# Patient Record
Sex: MALE | Race: WHITE | Employment: OTHER | URBAN - METROPOLITAN AREA
[De-identification: names, ages, dates, MRNs, and addresses within clinical notes are randomized per-mention and may not be internally consistent; named-entity substitution may affect disease eponyms.]

---

## 2017-02-13 ENCOUNTER — OFFICE VISIT (OUTPATIENT)
Dept: ORTHOPEDIC SURGERY | Age: 62
End: 2017-02-13

## 2017-02-13 VITALS — BODY MASS INDEX: 34.36 KG/M2 | HEIGHT: 70 IN | WEIGHT: 240 LBS

## 2017-02-13 DIAGNOSIS — Z96.652 STATUS POST TOTAL KNEE REPLACEMENT, LEFT: Primary | ICD-10-CM

## 2017-02-13 PROCEDURE — 99213 OFFICE O/P EST LOW 20 MIN: CPT | Performed by: ORTHOPAEDIC SURGERY

## 2017-02-13 RX ORDER — MELOXICAM 15 MG/1
15 TABLET ORAL DAILY
Qty: 90 TABLET | Refills: 2 | Status: SHIPPED | OUTPATIENT
Start: 2017-02-13 | End: 2019-05-03 | Stop reason: ALTCHOICE

## 2017-11-20 ENCOUNTER — OFFICE VISIT (OUTPATIENT)
Dept: ORTHOPEDIC SURGERY | Age: 62
End: 2017-11-20

## 2017-11-20 VITALS — HEIGHT: 70 IN | WEIGHT: 240 LBS | BODY MASS INDEX: 34.36 KG/M2

## 2017-11-20 DIAGNOSIS — Z96.652 STATUS POST TOTAL KNEE REPLACEMENT, LEFT: ICD-10-CM

## 2017-11-20 DIAGNOSIS — M25.562 LEFT KNEE PAIN, UNSPECIFIED CHRONICITY: Primary | ICD-10-CM

## 2017-11-20 PROCEDURE — 73560 X-RAY EXAM OF KNEE 1 OR 2: CPT | Performed by: ORTHOPAEDIC SURGERY

## 2017-11-20 PROCEDURE — 99213 OFFICE O/P EST LOW 20 MIN: CPT | Performed by: ORTHOPAEDIC SURGERY

## 2017-11-20 NOTE — PROGRESS NOTES
KNEE LEFT LIMITED  Diagnostic Test Findings:  2 views of the left knee show normal post-operative changes following a total knee arthroplasty. No signs of hardware loosening. Assessment :  left knee status post total knee arthroplasty. Impression:  Encounter Diagnoses   Name Primary?  Left knee pain, unspecified chronicity Yes    Status post total knee replacement, left        Office Procedures:  Orders Placed This Encounter   Procedures    XR KNEE LEFT (1-2 VIEWS)     [id number]     Order Specific Question:   Reason for exam:     Answer:   Pain       Treatment Plan: At this time, the patient is doing very well overall. We encouraged him to keep his strengthening up and will follow-up with us in approximately 1 year with new xrays at that time. Tammy Maya PA-C, scribing for Dr. Davion Gaston          This dictation was performed with a verbal recognition program [facility]) and it was checked for errors. It is possible that there are still dictated errors within this office note. If so, please bring any errors to my attention for an addendum. All efforts were made to ensure that this office note is accurate.

## 2018-01-22 ENCOUNTER — OFFICE VISIT (OUTPATIENT)
Dept: ORTHOPEDIC SURGERY | Age: 63
End: 2018-01-22

## 2018-01-22 VITALS — WEIGHT: 240 LBS | HEIGHT: 70 IN | BODY MASS INDEX: 34.36 KG/M2

## 2018-01-22 DIAGNOSIS — M25.40 EFFUSION INTO JOINT: ICD-10-CM

## 2018-01-22 DIAGNOSIS — M25.561 RIGHT KNEE PAIN, UNSPECIFIED CHRONICITY: Primary | ICD-10-CM

## 2018-01-22 PROCEDURE — 99213 OFFICE O/P EST LOW 20 MIN: CPT | Performed by: ORTHOPAEDIC SURGERY

## 2018-01-22 PROCEDURE — 20610 DRAIN/INJ JOINT/BURSA W/O US: CPT | Performed by: ORTHOPAEDIC SURGERY

## 2018-01-22 PROCEDURE — 73564 X-RAY EXAM KNEE 4 OR MORE: CPT | Performed by: ORTHOPAEDIC SURGERY

## 2018-01-22 NOTE — PROGRESS NOTES
SITE:  RIGHT KNEE    MARCAINE  NDC# [id number]  LOT NUMBER#  [id number]    DEPO 40MG  NDC# 8574-3678-12  LOT NUMBER#  [id number]    LIDOCAINE    NDC# 9128-1364-17  LOT NUMBER#  [id number]
Moderate-sized effusion. Range of Motion:  His range of motion is good he has full extension flexes to 130. Strength:  He has normal strength. Special Tests:  Ligamentous examination no evidence of instability. Skin: There are no rashes, ulcerations or lesions. Gait: walks today with a normal gait. Reflex: Normal    Ipsilateral Hip Exam:  Shows normal range of motion without pain. Distal neurologic and circulatory examination of the ipsilateral lower extremity appears intact without deficit    Additional Examinations:  Left Lower Extremity: Examination of the left lower extremity does not show any tenderness, deformity or injury. Range of motion is unremarkable. There is no gross instability. There are no rashes, ulcerations or lesions. Strength and tone are normal.      XR KNEE RIGHT STANDARD EXTENDED VW  MD ARTHROCENTESIS ASPIR&/INJ MAJOR JT/BURSA W/O US  MD METHYLPREDNISOLONE 40 MG INJ  Diagnostic Test Findings:  Xrays obtained and reviewed in office  4 views of the right knee show has a satisfactory joint space. Assessment :  Problem right knee medial meniscus tear with a large effusion. Impression:  Encounter Diagnoses   Name Primary?  Right knee pain, unspecified chronicity Yes    Effusion into joint        Office Procedures:  Orders Placed This Encounter   Procedures    XR KNEE RIGHT (MIN 4 VIEWS)     [id number]     Order Specific Question:   Reason for exam:     Answer:   Pain    MD ARTHROCENTESIS ASPIR&/INJ MAJOR JT/BURSA W/O US    MD METHYLPREDNISOLONE 40 MG INJ       Treatment Plan:  Current plan is to aspirate and inject his knee. We did had been obtained about 30 mL of clear yellow fluid. He tolerated the injection well. Follow-up in 3 weeks for reexamination. The risks and benefits of an injection were discussed with the patient. The patient had full opportunity to ask questions and all were answered. The patient then provided verbal informed consent.

## 2018-01-29 RX ORDER — AMOXICILLIN 500 MG/1
TABLET, FILM COATED ORAL
Qty: 4 TABLET | Refills: 3 | Status: SHIPPED | OUTPATIENT
Start: 2018-01-29 | End: 2019-03-12 | Stop reason: SDUPTHER

## 2018-12-12 ENCOUNTER — OFFICE VISIT (OUTPATIENT)
Dept: ORTHOPEDIC SURGERY | Age: 63
End: 2018-12-12
Payer: COMMERCIAL

## 2018-12-12 VITALS — BODY MASS INDEX: 34.36 KG/M2 | WEIGHT: 240 LBS | HEIGHT: 70 IN

## 2018-12-12 DIAGNOSIS — M25.561 RIGHT KNEE PAIN, UNSPECIFIED CHRONICITY: Primary | ICD-10-CM

## 2018-12-12 PROCEDURE — 20610 DRAIN/INJ JOINT/BURSA W/O US: CPT | Performed by: ORTHOPAEDIC SURGERY

## 2018-12-12 PROCEDURE — 99213 OFFICE O/P EST LOW 20 MIN: CPT | Performed by: ORTHOPAEDIC SURGERY

## 2019-03-21 ENCOUNTER — OFFICE VISIT (OUTPATIENT)
Dept: ORTHOPEDIC SURGERY | Age: 64
End: 2019-03-21
Payer: MEDICARE

## 2019-03-21 DIAGNOSIS — S83.8X1A ACUTE MEDIAL MENISCAL INJURY OF KNEE, RIGHT, INITIAL ENCOUNTER: ICD-10-CM

## 2019-03-21 DIAGNOSIS — M25.561 ACUTE PAIN OF RIGHT KNEE: Primary | ICD-10-CM

## 2019-03-21 PROCEDURE — 20610 DRAIN/INJ JOINT/BURSA W/O US: CPT | Performed by: PHYSICIAN ASSISTANT

## 2019-03-21 PROCEDURE — 1036F TOBACCO NON-USER: CPT | Performed by: PHYSICIAN ASSISTANT

## 2019-03-21 PROCEDURE — 4040F PNEUMOC VAC/ADMIN/RCVD: CPT | Performed by: PHYSICIAN ASSISTANT

## 2019-03-21 PROCEDURE — G8417 CALC BMI ABV UP PARAM F/U: HCPCS | Performed by: PHYSICIAN ASSISTANT

## 2019-03-21 PROCEDURE — G8427 DOCREV CUR MEDS BY ELIG CLIN: HCPCS | Performed by: PHYSICIAN ASSISTANT

## 2019-03-21 PROCEDURE — 3017F COLORECTAL CA SCREEN DOC REV: CPT | Performed by: PHYSICIAN ASSISTANT

## 2019-03-21 PROCEDURE — 1101F PT FALLS ASSESS-DOCD LE1/YR: CPT | Performed by: PHYSICIAN ASSISTANT

## 2019-03-21 PROCEDURE — G8484 FLU IMMUNIZE NO ADMIN: HCPCS | Performed by: PHYSICIAN ASSISTANT

## 2019-03-21 PROCEDURE — 1123F ACP DISCUSS/DSCN MKR DOCD: CPT | Performed by: PHYSICIAN ASSISTANT

## 2019-03-21 PROCEDURE — 99213 OFFICE O/P EST LOW 20 MIN: CPT | Performed by: PHYSICIAN ASSISTANT

## 2019-03-21 RX ORDER — AMOXICILLIN 500 MG/1
TABLET, FILM COATED ORAL
Qty: 4 TABLET | Refills: 3 | Status: SHIPPED | OUTPATIENT
Start: 2019-03-21 | End: 2019-05-03 | Stop reason: ALTCHOICE

## 2019-04-02 RX ORDER — AMOXICILLIN 500 MG/1
CAPSULE ORAL
Qty: 4 CAPSULE | Refills: 2 | Status: SHIPPED | OUTPATIENT
Start: 2019-04-02 | End: 2019-05-03 | Stop reason: ALTCHOICE

## 2019-04-22 ENCOUNTER — TELEPHONE (OUTPATIENT)
Dept: ORTHOPEDIC SURGERY | Age: 64
End: 2019-04-22

## 2019-04-22 ENCOUNTER — OFFICE VISIT (OUTPATIENT)
Dept: ORTHOPEDIC SURGERY | Age: 64
End: 2019-04-22
Payer: MEDICARE

## 2019-04-22 VITALS — WEIGHT: 240 LBS | BODY MASS INDEX: 34.36 KG/M2 | HEIGHT: 70 IN

## 2019-04-22 DIAGNOSIS — S83.8X1A ACUTE MEDIAL MENISCAL INJURY OF KNEE, RIGHT, INITIAL ENCOUNTER: Primary | ICD-10-CM

## 2019-04-22 DIAGNOSIS — S83.231D COMPLEX TEAR OF MEDIAL MENISCUS OF RIGHT KNEE AS CURRENT INJURY, SUBSEQUENT ENCOUNTER: Primary | ICD-10-CM

## 2019-04-22 PROCEDURE — 99213 OFFICE O/P EST LOW 20 MIN: CPT | Performed by: ORTHOPAEDIC SURGERY

## 2019-04-22 PROCEDURE — G8417 CALC BMI ABV UP PARAM F/U: HCPCS | Performed by: ORTHOPAEDIC SURGERY

## 2019-04-22 PROCEDURE — G8427 DOCREV CUR MEDS BY ELIG CLIN: HCPCS | Performed by: ORTHOPAEDIC SURGERY

## 2019-04-22 PROCEDURE — 3017F COLORECTAL CA SCREEN DOC REV: CPT | Performed by: ORTHOPAEDIC SURGERY

## 2019-04-22 PROCEDURE — 4040F PNEUMOC VAC/ADMIN/RCVD: CPT | Performed by: ORTHOPAEDIC SURGERY

## 2019-04-22 PROCEDURE — 1036F TOBACCO NON-USER: CPT | Performed by: ORTHOPAEDIC SURGERY

## 2019-04-22 PROCEDURE — 1123F ACP DISCUSS/DSCN MKR DOCD: CPT | Performed by: ORTHOPAEDIC SURGERY

## 2019-04-22 NOTE — PROGRESS NOTES
Chief Complaint:  Knee Pain (RIGHT  MRI RESULTS)      History of Present Illness:  Abilio Mcclelland is a 75 y.o. male here regarding right knee pain. He is status post a previous left total knee arthroplasty. His previous visit with us was in [facility] 2019 when he had an injection in his right knee. It helped a great deal and got him through his vacation to [place]. When he returns he got an MRI and is here today to go over those results. He denies any new injuries to his knee. The patient is currently ambulating independently. The pain is located Primarily on the medial side of his knee. The elliptical machine seemed to aggravate the knee. He describes the symptoms as aching and sharp. Symptoms improve with rest. The symptoms are made worse with activity. Medical History:  Patient's medications, allergies, past medical, surgical, social and family histories were reviewed and updated as appropriate. Review of Systems:  Pertinent items are noted in HPI  Review of systems reviewed from Patient History Form dated on 12/12/2018 and available in the patient's chart under the Media tab. Vital Signs: There were no vitals filed for this visit. Pain Assessment:  Pain Assessment  Location of Pain: Knee  Location Modifiers: Right  Severity of Pain: 5  Quality of Pain: Dull, Aching  Frequency of Pain: Intermittent  Aggravating Factors: Standing, Squatting, Walking, Stairs  Result of Injury: No  Work-Related Injury: No  Are there other pain locations you wish to document?: No         General Exam:   Constitutional: Patient is adequately groomed with no evidence of malnutrition  Mental Status: The patient is oriented to time, place and person. The patient's mood and affect are appropriate. Vascular: Examination reveals no swelling or calf tenderness. Peripheral pulses are palpable and 2+. Neurological: The patient has good coordination. There is no weakness or sensory deficit.     Right Knee Examination  Inspection:   No gross deformities noted. Minimal swelling noted. No erythema or ecchymosis. Skin is intact. Palpation:  Is tender mainly along the medial joint line. Minimal-sized effusion. Range of Motion:  His range of motion is good he has full extension flexes to 130. Strength:  He has normal strength. Special Tests:  Ligamentous examination no evidence of instability. Skin: There are no rashes, ulcerations or lesions. Gait: Slight antalgic gait. Reflex: Normal    Ipsilateral Hip Exam:  Shows normal range of motion without pain. Distal neurologic and circulatory examination of the ipsilateral lower extremity appears intact without deficit    Additional Examinations:  Left Lower Extremity: Examination of the left lower extremity does not show any tenderness, deformity or injury. Range of motion is within normal limits. There is no gross instability. There are no rashes, ulcerations or lesions. Strength and tone are normal.      None  Diagnostic Test Findings:  No new x-rays taken today    MRI of the right knee from April 18, 2014 shows a flap tear of the medial meniscus posterior horn with the flip-flap in the notch aspect. 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 Mild mucoid degeneration of the ACL. Assessment :  Right knee medial meniscus tear    Impression:  Encounter Diagnosis   Name Primary?  Acute medial meniscal injury of knee, right, initial encounter Yes       Office Procedures:  No orders of the defined types were placed in this encounter. Treatment Plan:  After going over the MRI results with the patient today, he is elected to proceed with surgical intervention. We will plan for a right knee video arthroscopy with partial medial meniscectomy and chondroplasty.     After discussion today, the patient has elected to proceed with surgical

## 2019-05-01 NOTE — PROGRESS NOTES
Abilio Sluder    Age 75 y.o.    male    1955    MRN [id number]    Date___________   Arrival Time_____________  OR Time____________Duration____     Procedure(s):  VIDEO ARTHROSCOPY RIGHT KNEE PARTIAL MEDIAL MENISECTOMY    Surgeon  ________________________________  [facility]   [place]       Phone [phone] (home) [phone] (work)      [address]  Cell Work  ______________________________________________________________________________________________________________________________________________________________________________________________________________________________________________________________________________________________________________________________________________________________    PCP__________________________Phone__________________________________      H&P__________________Bringing      Chart              Epic    DOS           Called_______  EKG__________________Bringing      Chart              Epic    DOS           Called_______  LAB__________________ Bringing      Chart              Epic    DOS           Called_______  CardiacClearance _______Bringing      Chart              Epic    DOS           Called_______      Cardiologist________________________ Phone___________________________      ? Yarsani concerns / Waiver on Chart            [employer]________________  ? Pre-op Instructions Given [place]          _________________________________  ? Directions to Surgery Center                          _________________________________  ? Transportation Home_______________      _________________________________  ?  Crutches/Walker__________________        _________________________________      ________Pre-op Orders   _______Transcribed    _______Wt.  ________Pharmacy          _______SCD  ______VTE     ______Beta Blocker  ________Consent

## 2019-05-08 ENCOUNTER — ANESTHESIA EVENT (OUTPATIENT)
Dept: OPERATING ROOM | Age: 64
End: 2019-05-08
Payer: MEDICARE

## 2019-05-09 ENCOUNTER — HOSPITAL ENCOUNTER (OUTPATIENT)
Age: 64
Setting detail: OUTPATIENT SURGERY
Discharge: HOME | End: 2019-05-09
Attending: ORTHOPAEDIC SURGERY | Admitting: ORTHOPAEDIC SURGERY
Payer: MEDICARE

## 2019-05-09 ENCOUNTER — ANESTHESIA (OUTPATIENT)
Dept: OPERATING ROOM | Age: 64
End: 2019-05-09
Payer: MEDICARE

## 2019-05-09 VITALS
OXYGEN SATURATION: 97 % | RESPIRATION RATE: 16 BRPM | BODY MASS INDEX: 34.07 KG/M2 | WEIGHT: 238 LBS | HEART RATE: 61 BPM | HEIGHT: 70 IN | TEMPERATURE: 97 F | DIASTOLIC BLOOD PRESSURE: 83 MMHG | SYSTOLIC BLOOD PRESSURE: 137 MMHG

## 2019-05-09 VITALS
OXYGEN SATURATION: 99 % | SYSTOLIC BLOOD PRESSURE: 158 MMHG | DIASTOLIC BLOOD PRESSURE: 96 MMHG | RESPIRATION RATE: 21 BRPM

## 2019-05-09 DIAGNOSIS — S83.231A COMPLEX TEAR OF MEDIAL MENISCUS OF RIGHT KNEE AS CURRENT INJURY, INITIAL ENCOUNTER: ICD-10-CM

## 2019-05-09 DIAGNOSIS — Z98.890 S/P RIGHT KNEE ARTHROSCOPY: Primary | ICD-10-CM

## 2019-05-09 PROCEDURE — 2709999900 HC NON-CHARGEABLE SUPPLY: Performed by: ORTHOPAEDIC SURGERY

## 2019-05-09 PROCEDURE — 6370000000 HC RX 637 (ALT 250 FOR IP): Performed by: ANESTHESIOLOGY

## 2019-05-09 PROCEDURE — 3700000001 HC ADD 15 MINUTES (ANESTHESIA): Performed by: ORTHOPAEDIC SURGERY

## 2019-05-09 PROCEDURE — 3600000014 HC SURGERY LEVEL 4 ADDTL 15MIN: Performed by: ORTHOPAEDIC SURGERY

## 2019-05-09 PROCEDURE — 7100000001 HC PACU RECOVERY - ADDTL 15 MIN: Performed by: ORTHOPAEDIC SURGERY

## 2019-05-09 PROCEDURE — 2500000003 HC RX 250 WO HCPCS: Performed by: NURSE ANESTHETIST, CERTIFIED REGISTERED

## 2019-05-09 PROCEDURE — 6360000002 HC RX W HCPCS: Performed by: ORTHOPAEDIC SURGERY

## 2019-05-09 PROCEDURE — 7100000010 HC PHASE II RECOVERY - FIRST 15 MIN: Performed by: ORTHOPAEDIC SURGERY

## 2019-05-09 PROCEDURE — 3600000004 HC SURGERY LEVEL 4 BASE: Performed by: ORTHOPAEDIC SURGERY

## 2019-05-09 PROCEDURE — 2580000003 HC RX 258: Performed by: ANESTHESIOLOGY

## 2019-05-09 PROCEDURE — 7100000000 HC PACU RECOVERY - FIRST 15 MIN: Performed by: ORTHOPAEDIC SURGERY

## 2019-05-09 PROCEDURE — 2500000003 HC RX 250 WO HCPCS: Performed by: ORTHOPAEDIC SURGERY

## 2019-05-09 PROCEDURE — 7100000011 HC PHASE II RECOVERY - ADDTL 15 MIN: Performed by: ORTHOPAEDIC SURGERY

## 2019-05-09 PROCEDURE — 2580000003 HC RX 258: Performed by: ORTHOPAEDIC SURGERY

## 2019-05-09 PROCEDURE — 2720000010 HC SURG SUPPLY STERILE: Performed by: ORTHOPAEDIC SURGERY

## 2019-05-09 PROCEDURE — 6360000002 HC RX W HCPCS: Performed by: NURSE ANESTHETIST, CERTIFIED REGISTERED

## 2019-05-09 PROCEDURE — 3700000000 HC ANESTHESIA ATTENDED CARE: Performed by: ORTHOPAEDIC SURGERY

## 2019-05-09 RX ORDER — METHYLPREDNISOLONE ACETATE 80 MG/ML
INJECTION, SUSPENSION INTRA-ARTICULAR; INTRALESIONAL; INTRAMUSCULAR; SOFT TISSUE PRN
Status: DISCONTINUED | OUTPATIENT
Start: 2019-05-09 | End: 2019-05-09 | Stop reason: ALTCHOICE

## 2019-05-09 RX ORDER — ACETAMINOPHEN 10 MG/ML
1000 INJECTION, SOLUTION INTRAVENOUS ONCE
Status: COMPLETED | OUTPATIENT
Start: 2019-05-09 | End: 2019-05-09

## 2019-05-09 RX ORDER — SODIUM CHLORIDE, SODIUM LACTATE, POTASSIUM CHLORIDE, CALCIUM CHLORIDE 600; 310; 30; 20 MG/100ML; MG/100ML; MG/100ML; MG/100ML
INJECTION, SOLUTION INTRAVENOUS CONTINUOUS
Status: DISCONTINUED | OUTPATIENT
Start: 2019-05-09 | End: 2019-05-09 | Stop reason: HOSPADM

## 2019-05-09 RX ORDER — OXYCODONE HYDROCHLORIDE AND ACETAMINOPHEN 5; 325 MG/1; MG/1
1 TABLET ORAL ONCE
Status: COMPLETED | OUTPATIENT
Start: 2019-05-09 | End: 2019-05-09

## 2019-05-09 RX ORDER — DEXAMETHASONE SODIUM PHOSPHATE 4 MG/ML
INJECTION, SOLUTION INTRA-ARTICULAR; INTRALESIONAL; INTRAMUSCULAR; INTRAVENOUS; SOFT TISSUE PRN
Status: DISCONTINUED | OUTPATIENT
Start: 2019-05-09 | End: 2019-05-09 | Stop reason: SDUPTHER

## 2019-05-09 RX ORDER — FENTANYL CITRATE 50 UG/ML
INJECTION, SOLUTION INTRAMUSCULAR; INTRAVENOUS PRN
Status: DISCONTINUED | OUTPATIENT
Start: 2019-05-09 | End: 2019-05-09 | Stop reason: SDUPTHER

## 2019-05-09 RX ORDER — HYDROCODONE BITARTRATE AND ACETAMINOPHEN 5; 325 MG/1; MG/1
1 TABLET ORAL EVERY 6 HOURS PRN
Qty: 28 TABLET | Refills: 0 | Status: SHIPPED | OUTPATIENT
Start: 2019-05-09 | End: 2019-05-16

## 2019-05-09 RX ORDER — MIDAZOLAM HYDROCHLORIDE 1 MG/ML
INJECTION INTRAMUSCULAR; INTRAVENOUS PRN
Status: DISCONTINUED | OUTPATIENT
Start: 2019-05-09 | End: 2019-05-09 | Stop reason: SDUPTHER

## 2019-05-09 RX ORDER — SODIUM CHLORIDE, SODIUM LACTATE, POTASSIUM CHLORIDE, CALCIUM CHLORIDE 600; 310; 30; 20 MG/100ML; MG/100ML; MG/100ML; MG/100ML
INJECTION, SOLUTION INTRAVENOUS CONTINUOUS PRN
Status: COMPLETED | OUTPATIENT
Start: 2019-05-09 | End: 2019-05-09

## 2019-05-09 RX ORDER — SODIUM CHLORIDE 0.9 % (FLUSH) 0.9 %
10 SYRINGE (ML) INJECTION EVERY 12 HOURS SCHEDULED
Status: DISCONTINUED | OUTPATIENT
Start: 2019-05-09 | End: 2019-05-09 | Stop reason: HOSPADM

## 2019-05-09 RX ORDER — BUPIVACAINE HYDROCHLORIDE 2.5 MG/ML
INJECTION, SOLUTION INFILTRATION; PERINEURAL PRN
Status: DISCONTINUED | OUTPATIENT
Start: 2019-05-09 | End: 2019-05-09 | Stop reason: ALTCHOICE

## 2019-05-09 RX ORDER — EPHEDRINE SULFATE 50 MG/ML
INJECTION INTRAVENOUS PRN
Status: DISCONTINUED | OUTPATIENT
Start: 2019-05-09 | End: 2019-05-09 | Stop reason: SDUPTHER

## 2019-05-09 RX ORDER — PROPOFOL 10 MG/ML
INJECTION, EMULSION INTRAVENOUS PRN
Status: DISCONTINUED | OUTPATIENT
Start: 2019-05-09 | End: 2019-05-09 | Stop reason: SDUPTHER

## 2019-05-09 RX ORDER — LIDOCAINE HYDROCHLORIDE 10 MG/ML
1 INJECTION, SOLUTION EPIDURAL; INFILTRATION; INTRACAUDAL; PERINEURAL
Status: DISCONTINUED | OUTPATIENT
Start: 2019-05-09 | End: 2019-05-09 | Stop reason: HOSPADM

## 2019-05-09 RX ORDER — SODIUM CHLORIDE 0.9 % (FLUSH) 0.9 %
10 SYRINGE (ML) INJECTION PRN
Status: DISCONTINUED | OUTPATIENT
Start: 2019-05-09 | End: 2019-05-09 | Stop reason: HOSPADM

## 2019-05-09 RX ORDER — OXYCODONE HYDROCHLORIDE AND ACETAMINOPHEN 5; 325 MG/1; MG/1
TABLET ORAL
Status: DISCONTINUED
Start: 2019-05-09 | End: 2019-05-09 | Stop reason: HOSPADM

## 2019-05-09 RX ORDER — ONDANSETRON 2 MG/ML
INJECTION INTRAMUSCULAR; INTRAVENOUS PRN
Status: DISCONTINUED | OUTPATIENT
Start: 2019-05-09 | End: 2019-05-09 | Stop reason: SDUPTHER

## 2019-05-09 RX ADMIN — ACETAMINOPHEN 1000 MG: 10 INJECTION, SOLUTION INTRAVENOUS at 09:22

## 2019-05-09 RX ADMIN — FENTANYL CITRATE 100 MCG: 50 INJECTION INTRAMUSCULAR; INTRAVENOUS at 10:35

## 2019-05-09 RX ADMIN — OXYCODONE HYDROCHLORIDE AND ACETAMINOPHEN 1 TABLET: 5; 325 TABLET ORAL at 11:46

## 2019-05-09 RX ADMIN — ONDANSETRON 4 MG: 2 INJECTION INTRAMUSCULAR; INTRAVENOUS at 10:36

## 2019-05-09 RX ADMIN — SODIUM CHLORIDE, POTASSIUM CHLORIDE, SODIUM LACTATE AND CALCIUM CHLORIDE: 600; 310; 30; 20 INJECTION, SOLUTION INTRAVENOUS at 09:21

## 2019-05-09 RX ADMIN — DEXAMETHASONE SODIUM PHOSPHATE 6 MG: 4 INJECTION, SOLUTION INTRAMUSCULAR; INTRAVENOUS at 10:36

## 2019-05-09 RX ADMIN — EPHEDRINE SULFATE 10 MG: 50 INJECTION INTRAVENOUS at 10:42

## 2019-05-09 RX ADMIN — Medication 2 G: at 10:31

## 2019-05-09 RX ADMIN — MIDAZOLAM HYDROCHLORIDE 2 MG: 2 INJECTION, SOLUTION INTRAMUSCULAR; INTRAVENOUS at 10:31

## 2019-05-09 RX ADMIN — PROPOFOL 200 MG: 10 INJECTION, EMULSION INTRAVENOUS at 10:35

## 2019-05-09 ASSESSMENT — PULMONARY FUNCTION TESTS
PIF_VALUE: 2
PIF_VALUE: 4
PIF_VALUE: 21
PIF_VALUE: 1
PIF_VALUE: 14
PIF_VALUE: 3
PIF_VALUE: 17
PIF_VALUE: 2
PIF_VALUE: 18
PIF_VALUE: 17
PIF_VALUE: 18
PIF_VALUE: 3
PIF_VALUE: 0
PIF_VALUE: 3
PIF_VALUE: 0
PIF_VALUE: 12
PIF_VALUE: 3
PIF_VALUE: 22
PIF_VALUE: 23
PIF_VALUE: 2
PIF_VALUE: 2
PIF_VALUE: 3
PIF_VALUE: 2
PIF_VALUE: 16
PIF_VALUE: 3
PIF_VALUE: 17
PIF_VALUE: 18
PIF_VALUE: 0
PIF_VALUE: 2
PIF_VALUE: 17
PIF_VALUE: 15
PIF_VALUE: 0
PIF_VALUE: 18
PIF_VALUE: 16
PIF_VALUE: 0
PIF_VALUE: 24

## 2019-05-09 ASSESSMENT — PAIN SCALES - GENERAL
PAINLEVEL_OUTOF10: 4
PAINLEVEL_OUTOF10: 2
PAINLEVEL_OUTOF10: 4
PAINLEVEL_OUTOF10: 2
PAINLEVEL_OUTOF10: 0

## 2019-05-09 ASSESSMENT — PAIN - FUNCTIONAL ASSESSMENT: PAIN_FUNCTIONAL_ASSESSMENT: 0-10

## 2019-05-09 NOTE — ANESTHESIA PRE PROCEDURE
lidocaine PF 1 % injection 1 mL  1 mL Intradermal Once PRN Zunilda Montoya MD           Allergies:  No Known Allergies    Problem List:    Patient Active Problem List   Diagnosis Code    Fracture of second toe, right, closed S92.501A    HTN (hypertension) I10    Hyperlipidemia E78.5    Obesity E66.9    Status post total knee replacement, left T36.914       Past Medical History:        Diagnosis Date    Arthritis     Hyperlipidemia     Hypertension     Oral herpes simplex infection     Sleep apnea     CPAP       Past Surgical History:        Procedure Laterality Date    COLONOSCOPY  2003    COLONOSCOPY  5/16/14    KNEE ARTHROSCOPY Left     x 2    TONSILLECTOMY      TOTAL KNEE ARTHROPLASTY Left 11/15/2016       Social History:    Social History     Tobacco Use    Smoking status: Never Smoker    Smokeless tobacco: Never Used   Substance Use Topics    Alcohol use: Yes     Alcohol/week: 1.2 oz     Types: 2 Cans of beer per week     Comment: 2/wk                                Counseling given: Not Answered      Vital Signs (Current):   Vitals:    05/03/19 1017   Weight: 238 lb (108 kg)   Height: 5' 10\" (1.778 m)                                              BP Readings from Last 3 Encounters:   11/16/16 104/72   08/11/16 132/86   04/28/16 139/89       NPO Status:                                                                                 BMI:   Wt Readings from Last 3 Encounters:   05/03/19 238 lb (108 kg)   04/22/19 240 lb (108.9 kg)   12/12/18 240 lb (108.9 kg)     Body mass index is 34.15 kg/m².     CBC:   Lab Results   Component Value Date    WBC 10.3 11/16/2016    RBC 3.72 11/16/2016    HGB 11.6 11/16/2016    HCT 33.6 11/16/2016    MCV 90.3 11/16/2016    RDW 13.1 11/16/2016     11/16/2016       CMP:   Lab Results   Component Value Date     11/16/2016    K 3.8 11/16/2016     11/16/2016    CO2 28 11/16/2016    BUN 19 11/16/2016    CREATININE 0.9 11/16/2016    GFRAA >60 11/16/2016    LABGLOM >60 11/16/2016    GLUCOSE 121 11/16/2016    CALCIUM 8.4 11/16/2016       POC Tests: No results for input(s): POCGLU, POCNA, POCK, POCCL, POCBUN, POCHEMO, POCHCT in the last 72 hours. Coags:   Lab Results   Component Value Date    PROTIME 11.8 11/09/2016    INR 1.03 11/09/2016    APTT 38.8 11/15/2016       HCG (If Applicable): No results found for: PREGTESTUR, PREGSERUM, HCG, HCGQUANT     ABGs: No results found for: PHART, PO2ART, VJT8YID, HFN5BJS, BEART, W6FNZJUX     Type & Screen (If Applicable):  No results found for: LABABO, LABRH    Anesthesia Evaluation    Airway: Mallampati: III  TM distance: >3 FB   Neck ROM: full  Mouth opening: > = 3 FB Dental:          Pulmonary:   (+) sleep apnea:                             Cardiovascular:    (+) hypertension:,                   Neuro/Psych:               GI/Hepatic/Renal:             Endo/Other:                     Abdominal:           Vascular:                                        Anesthesia Plan      general     ASA 2     (I discussed with the patient the risks and benefits of PIV, general anesthesia, IV Narcotics, PACU. All questions were answered the patient agrees with the plan.)  Induction: intravenous. Anesthetic plan and risks discussed with patient. Plan discussed with CRNA.                   Keyana Mario MD   5/9/2019

## 2019-05-09 NOTE — PROGRESS NOTES
Discharge instructions reviewed with patient/responsible adult and understanding verbalized. Discharge instructions signed and copies given. Patient discharged home with belongings. No n/v. Pain 3/10 tolerable.  Assisted to dress

## 2019-05-09 NOTE — OP NOTE
was placed in the proximal right lower extremity. The  extremity was prepped with Chloraprep and draped sterilely. A standard  superomedial, anteromedial and anterolateral portals were made. The  arthroscope was introduced. Inspection of the suprapatellar pouch  identified inflammatory changes within the pouch. He did have grade 2  and 3 chondromalacia of the patellofemoral joint and the trochlear  groove which required patella chondroplasty that was performed using a  small motorized shaver. We then passed the scope down to the medial  compartment. In the medial compartment, he had a very complex posterior horn and body  medial meniscus tear. The meniscus had peripheral rim that was intact. Therefore, we proceeded with partial medial meniscectomy using both  handheld and motorized instruments. Also, noticed that there is a  meniscal root that was intact as well. We used again motorized sheila  and baskets as well as the radiofrequency to help trim and smooth the  meniscus. We were very satisfied with result. All the loose fragments  were removed. Again, the patellofemoral chondroplasty was performed in  a separate compartment. At that time, the arthroscope was removed. The  portals were all closed with nylon suture. Sterile dressing was  applied. He was awakened and transported to Recovery in stable  condition.         Evan Schultz MD    D: 05/09/2019 11:21:43       T: 05/09/2019 14:33:50     [email or web address]  Job#: [id number]     Doc#: [id number]    CC:

## 2019-05-09 NOTE — H&P
I have reviewed the history and physical and examined the patient and find no relevant changes. I have reviewed with the patient and/or family the risks, benefits, and alternatives to the procedure.     Kia Escobedo MD  5/9/2019

## 2019-05-10 NOTE — ANESTHESIA POSTPROCEDURE EVALUATION
Department of Anesthesiology  Postprocedure Note    Patient: Judith Del Angel  MRN: [id number]  [place]: 1955  Date of evaluation: 5/10/2019  Time:  11:51 AM     Procedure Summary     Date:  05/09/19 Room / Location:  [facility] OR 02 / [facility] OR    Anesthesia Start:  1031 Anesthesia Stop:  1107    Procedure:  VIDEO ARTHROSCOPY RIGHT KNEE, PARTIAL MEDIAL MENISCECTOMY (Right Knee) Diagnosis:       Complex tear of medial meniscus of right knee as current injury, initial encounter      (Complex tear of medial meniscus of right knee as current injury, initial encounter [I57.676V])    Surgeon:  Ray Lombardi MD Responsible Provider:  Kellen Jama MD    Anesthesia Type:  general ASA Status:  2          Anesthesia Type: general    Luís Phase I: Luís Score: 10    Luís Phase II: Luís Score: 10    Last vitals: Reviewed and per EMR flowsheets. Anesthesia Post Evaluation    Patient location during evaluation: PACU  Level of consciousness: awake and alert  Airway patency: patent  Nausea & Vomiting: no nausea and no vomiting  Complications: no  Cardiovascular status: blood pressure returned to baseline  Respiratory status: acceptable  Hydration status: euvolemic  Comments: Postoperative Anesthesia Note    Name:    Judith Del Angel  MRN:      [id number]    Empty flowsheet group.        LABS:    CBC  Lab Results       Component                Value               Date/Time                  WBC                      10.3                11/16/2016 07:01 AM        HGB                      11.6 (L)            11/16/2016 07:01 AM        HCT                      33.6 (L)            11/16/2016 07:01 AM        PLT                      153                 11/16/2016 07:01 AM   RENAL  Lab Results       Component                Value               Date/Time                  NA                       139                 11/16/2016 07:01 AM        K                        3.8                 11/16/2016

## 2019-05-13 ENCOUNTER — HOSPITAL ENCOUNTER (OUTPATIENT)
Dept: PHYSICAL THERAPY | Age: 64
Setting detail: THERAPIES SERIES
Discharge: HOME | End: 2019-05-13
Payer: MEDICARE

## 2019-05-13 PROCEDURE — G0283 ELEC STIM OTHER THAN WOUND: HCPCS | Performed by: PHYSICAL THERAPIST

## 2019-05-13 PROCEDURE — 97161 PT EVAL LOW COMPLEX 20 MIN: CPT | Performed by: PHYSICAL THERAPIST

## 2019-05-13 PROCEDURE — 97110 THERAPEUTIC EXERCISES: CPT | Performed by: PHYSICAL THERAPIST

## 2019-05-13 NOTE — PLAN OF CARE
The [address] [facility]  [address], [place], [address] [facility]  Phone: [phone]   Fax:     [phone]                                                       Physical Therapy Certification    Dear Referring Practitioner: Dr Louis Hahn,    We had the pleasure of evaluating the following patient for physical therapy services at [address] [place] [address]. A summary of our findings can be found in the initial assessment below. This includes our plan of care. If you have any questions or concerns regarding these findings, please do not hesitate to contact me at the office phone number checked above. Thank you for the referral.       Physician Signature:_______________________________Date:__________________  By signing above (or electronic signature), therapists plan is approved by physician      Patient: Niurka Luong   : 1955   MRN: [id number]  Referring Physician: Referring Practitioner: Dr Louis Hahn      Evaluation Date: 2019      Medical Diagnosis Information:  Diagnosis: Right knee PMM    S83.231D   Treatment Diagnosis: PT treatment diagnosis:  Right knee pain, stiffness, weakness                                         Insurance information:       Precautions/ Contra-indications: HTN, L TKR  Latex Allergy:  [x]NO      []YES  Preferred Language for Healthcare:   [x]English       []other:    SUBJECTIVE: Patient stated complaint:  Right knee scope with PMM on 19. Currently states the knee is feeling good and has had no problems sleeping at night. Currently using one crutch with gait. MD follow up on 19.     Relevant Medical History:see above  Functional Disability Index:LEFS: 14%  G-Codes  The patient demonstrates at least 1% but less than 20% impairment, limitation or restriction in:   - walking and moving around (mobility)       Pain Scale: 3-4/10  Easing factors: ice, elevation  Provocative factors: standing, walking, driving     Type: []Constant   [x]Intermittent  []Radiating []Localized []other:     Numbness/Tingling: non    Functional Limitations/Impairments: []Sitting []Standing [x]Walking    []Squatting/bending  []Stairs           []ADL's  []Transfers [x]Sports/Recreation [x]Other: golf, biking, gym     Occupation/School:  retired     Living Status/Prior Level of Function: Independent with ADLs and IADLs  (insert highest prior level of function)    OBJECTIVE:     ROM LEFT RIGHT   HIP Flex     HIP Abd     HIP Ext     HIP IR     HIP ER     Knee ext  0   Knee Flex  105   Ankle PF     Ankle DF     Ankle In     Ankle Ev     Strength  LEFT RIGHT   HIP Flexors     HIP Abductors  4/5   HIP Ext     Hip ER     Knee EXT (quad)  4/5   Knee Flex (HS)     Ankle DF     Ankle PF     Ankle Inv     Ankle EV          Circumference  Mid-patella    44.1cm   LE Dermatomes     LE myotomes       Single Leg Squat: n/a  Single Leg Stance: n/a    Joint mobility:    [x]Normal    []Hypo   []Hyper    Palpation: mild tenderness around incision portals    Functional Mobility/Transfers: independent    Posture: n/a    Bandages/Dressings/Incisions: stitches in place covered with Tegaderm. No drainage, redness or signs of infection    Gait: (include devices/WB status) WBAT, using single crutch to aid with gait. Orthopedic Special Tests: (-)Nydia's                       [x] Patient history, allergies, meds reviewed. Medical chart reviewed. See intake form. Review Of Systems (ROS):  [x]Performed Review of systems (Integumentary, CardioPulmonary, Neurological) by intake and observation. Intake form has been scanned into medical record. Patient has been instructed to contact their primary care physician regarding ROS issues if not already being addressed at this time.         Co-morbidities/Complexities (which will affect course of rehabilitation):   []None           Arthritic conditions []Rheumatoid arthritis (M05.9)  []Osteoarthritis (M19.91)   Cardiovascular conditions   [x]Hypertension (I10)  []Hyperlipidemia (E78.5)  []Angina pectoris (I20)  []Atherosclerosis (I70)   Musculoskeletal conditions   []Disc pathology   []Congenital spine pathologies   []Prior surgical intervention  []Osteoporosis (M81.8)  []Osteopenia (M85.8)   Endocrine conditions   []Hypothyroid (E03.9)  []Hyperthyroid Gastrointestinal conditions   []Constipation (C86.63)   Metabolic conditions   []Morbid obesity (E66.01)  []Diabetes type 1(E10.65) or 2 (E11.65)   []Neuropathy (G60.9)     Pulmonary conditions   []Asthma (J45)  []Coughing   []COPD (J44.9)   Psychological Disorders  []Anxiety (F41.9)  []Depression (F32.9)   []Other:   [x]Other:   Left TKR         Barriers to/and or personal factors that will affect rehab potential:              []Age  []Sex              []Motivation/Lack of Motivation                        []Co-Morbidities              []Cognitive Function, education/learning barriers              []Environmental, home barriers              []profession/work barriers  []past PT/medical experience  []other:  Justification: HTN may affect healing and limit exercise intensity    PACEMAKER:  - Denied having a pacemaker that would contraindicate the use of electrical modalities. METAL IMPLANTS:  - Denied metal implants that would contraindicate the use of thermal modalities. CANCER HISTORY:  - Denied a history of cancer that would contraindicate thermal modalities. Falls Risk Assessment (30 days):   [x] Falls Risk assessed and no intervention required.   [] Falls Risk assessed and Patient requires intervention due to being higher risk   TUG score (>12s at risk):     [] Falls education provided, including           ASSESSMENT:   Functional Impairments:     []Noted lumbar/proximal hip/LE joint hypomobility   [x]Decreased LE functional ROM   []Decreased core/proximal hip strength and neuromuscular control   [x]Decreased LE functional strength   [x]Reduced balance/proprioceptive control   []other:      Functional Activity Limitations (from functional questionnaire and intake)   []Reduced ability to tolerate prolonged functional positions   []Reduced ability or difficulty with changes of positions or transfers between positions   []Reduced ability to maintain good posture and demonstrate good body mechanics with sitting, bending, and lifting   []Reduced ability to sleep   [] Reduced ability or tolerance with driving and/or computer work   [x]Reduced ability to perform lifting, carrying tasks   [x]Reduced ability to squat   []Reduced ability to forward bend   [x]Reduced ability to ambulate prolonged functional periods/distances/surfaces   [x]Reduced ability to ascend/descend stairs   [x]Reduced ability to run, hop, cut or jump   []other:    Participation Restrictions   []Reduced participation in self care activities   [x]Reduced participation in home management activities   []Reduced participation in work activities   []Reduced participation in social activities. [x]Reduced participation in sport/recreation activities. Classification :    [x]Signs/symptoms consistent with post-surgical status including decreased ROM, strength and function.    []Signs/symptoms consistent with joint sprain/strain   []Signs/symptoms consistent with patella-femoral syndrome   []Signs/symptoms consistent with knee OA/hip OA   []Signs/symptoms consistent with internal derangement of knee/Hip   []Signs/symptoms consistent with functional hip weakness/NMR control      []Signs/symptoms consistent with tendinitis/tendinosis    []signs/symptoms consistent with pathology which may benefit from Dry needling      []other:      Prognosis/Rehab Potential:      []Excellent   [x]Good    []Fair   []Poor    Tolerance of evaluation/treatment:    []Excellent   [x]Good    []Fair   []Poor    Physical Therapy Evaluation Complexity Justification  [x] A history of present problem with:  [] no personal factors and/or comorbidities that impact the plan of care;  [x]1-2 personal factors and/or comorbidities that impact the plan of care  []3 personal factors and/or comorbidities that impact the plan of care  [x] An examination of body systems using standardized tests and measures addressing any of the following: body structures and functions (impairments), activity limitations, and/or participation restrictions;:  [] a total of 1-2 or more elements   [x] a total of 3 or more elements   [] a total of 4 or more elements   [x] A clinical presentation with:  [x] stable and/or uncomplicated characteristics   [] evolving clinical presentation with changing characteristics  [] unstable and unpredictable characteristics;   [x] Clinical decision making of [x] low, [] moderate, [] high complexity using standardized patient assessment instrument and/or measurable assessment of functional outcome. [x] EVAL (LOW) 18325 (typically 20 minutes face-to-face)  [] EVAL (MOD) 54538 (typically 30 minutes face-to-face)  [] EVAL (HIGH) 95090 (typically 45 minutes face-to-face)  [] RE-EVAL     PLAN  Frequency/Duration:  2 days per week for 5 Weeks:  Interventions:  1. Therapeutic exercise including: strength training, ROM/flexibility, NMR and proprioception for the proximal hip, core and Lower extremity  2. Manual therapy as indicated including Dry Needling/IASTM, STM, PROM, Gr I-IV mobilizations, spinal mobilization/manipulation. 3. Modalities as needed including: thermal agents, E-stim, US, iontophoresis as indicated. 4. Patient education on joint protection, activity modification, progression of HEP. HEP instruction: Can be found scanned in media file. (see scanned forms)    GOALS:  Patient stated goal: walk, go to gym, play golf pain free    The patient will demonstrate 0% impairment, limitation or restriction in:   - walking and moving around (mobility)     Therapist goals

## 2019-05-13 NOTE — FLOWSHEET NOTE
The [address] [place] [address] [facility]    Physical Therapy Daily Treatment Note  Date:  2019    Patient Name:  Yumiko Aguero    :  1955  MRN: [id number]  Restrictions/Precautions:    Medical/Treatment Diagnosis Information:  · Diagnosis: Right knee PMM    S83.231D  · Treatment Diagnosis: PT treatment diagnosis:  Right knee pain, stiffness, weakness  Insurance/Certification information:     Physician Information:  Referring Practitioner: Dr Reagan [place] of care signed (Y/N):     Date of Patient follow up with Physician:     G-Code (if applicable):      Date G-Code Applied: The patient demonstrates at least 1% but less than 20% impairment, limitation or restriction in:   - walking and moving around (mobility)     Progress Note: [x]  Yes  []  No  Next due by: Visit #10       Latex Allergy:  [x]NO      []YES  Preferred Language for Healthcare:   [x]English       []other:    Visit # Insurance Allowable   1 ??? Auth Required   []  Yes    [] No    Visits Approved  Date Ranged-       Pain level:  3-4/10     SUBJECTIVE:  See eval    OBJECTIVE: See eval   Observation:    Test measurements:      RESTRICTIONS/PRECAUTIONS: HTN, L TKR    Exercises/Interventions:       DOS:  19  Script:  19  Exercise Sets/Reps Notes Last Progression   Gastroc Stretch 5x30'' belt    Heelslides       LLLD Wallslide      HS Stretch:  Tableside  5x30''     SAQ      LAQ      SLR Flex 3x10     SLR Abd 3x10      SLR Ext      SLR Add.       Clamshells      Bridges 3x10     HR/TR      Ankle Theraband      BAPS      Bike 5'     Elliptical      Standing Stretch:  (insert muscles)      Weight Shifting      Lateral Walk      Squats      Single Leg Stance Balance                            Therapeutic Exercise and NMR EXR  [] (56449) Provided verbal/tactile cueing for activities related to strengthening, flexibility, endurance, ROM for improvements in LE, proximal hip, and core control with Total Treatment Minutes: 45     [x] EVAL (LOW) 34724 (typically 20 minutes face-to-face)  [] EVAL (MOD) 60320 (typically 30 minutes face-to-face)  [] EVAL (HIGH) 26277 (typically 45 minutes face-to-face)  [] RE-EVAL     [x] WD(09858) x     [] IONTO  [] NMR (50465) x     [] VASO  [] Manual (33444) x      [] Other:  [] TA x      [] Mech Traction (92224)  [] ES(attended) (34534)      [x] ES (un) (58967):     GOALS:   Short Term Goals: To be achieved in: 2 weeks  1. Independent in HEP and progression per patient tolerance, in order to prevent re-injury. 2. Patient will have a decrease in pain to facilitate improvement in movement, function, and ADLs as indicated by Functional Deficits. Long Term Goals: To be achieved in: 5 weeks  1. Disability index score of 0% for the LEFS to assist with reaching prior level of function. 2. Patient will demonstrate increased AROM to 0-130 to allow for proper joint functioning as indicated by patients Functional Deficits. 3. Patient will demonstrate an increase in Strength to 5/5 knee ext/hip abd to allow for proper functional mobility as indicated by patients Functional Deficits. 4. Patient will return to walking with normal gait pattern and without increased symptoms or restriction. Progression Towards Functional goals:  [] Patient is progressing as expected towards functional goals listed. [] Progression is slowed due to complexities listed. [] Progression has been slowed due to co-morbidities.   [x] Plan just implemented, too soon to assess goals progression  [] Other:     ASSESSMENT:  See eval    Treatment/Activity Tolerance:  [x] Patient tolerated treatment well [] Patient limited by fatique  [] Patient limited by pain  [] Patient limited by other medical complications  [] Other:     Prognosis: [x] Good [] Fair  [] Poor    Patient Requires Follow-up: [x] Yes  [] No    PLAN FOR NEXT SESSION:     PLAN: See eval  [] Continue per plan of care [] Alter current plan (see comments)  [x] Plan of care initiated [] Hold pending MD visit [] Discharge    *If patient does not return for further follow ups after this date. Please consider this as the patients discharge from physical therapy.        Electronically signed by: Jeanna Jones PT [id number]

## 2019-05-16 ENCOUNTER — HOSPITAL ENCOUNTER (OUTPATIENT)
Dept: PHYSICAL THERAPY | Age: 64
Setting detail: THERAPIES SERIES
Discharge: HOME | End: 2019-05-16
Payer: MEDICARE

## 2019-05-16 PROCEDURE — 97112 NEUROMUSCULAR REEDUCATION: CPT | Performed by: PHYSICAL THERAPIST

## 2019-05-16 PROCEDURE — G0283 ELEC STIM OTHER THAN WOUND: HCPCS | Performed by: PHYSICAL THERAPIST

## 2019-05-16 PROCEDURE — 97110 THERAPEUTIC EXERCISES: CPT | Performed by: PHYSICAL THERAPIST

## 2019-05-16 NOTE — FLOWSHEET NOTE
The [address] [place] [address] [facility]    Physical Therapy Daily Treatment Note  Date:  2019    Patient Name:  Yohana Millan    :  1955  MRN: [id number]  Restrictions/Precautions:    Medical/Treatment Diagnosis Information:  · Diagnosis: Right knee PMM    S83.231D  · Treatment Diagnosis: PT treatment diagnosis:  Right knee pain, stiffness, weakness  Insurance/Certification information:     Physician Information:  Referring Practitioner: Dr Fritz King of care signed (Y/N):     Date of Patient follow up with Physician:     G-Code (if applicable):      Date G-Code Applied: The patient demonstrates at least 1% but less than 20% impairment, limitation or restriction in:   - walking and moving around (mobility)     Progress Note: [x]  Yes  []  No  Next due by: Visit #10       Latex Allergy:  [x]NO      []YES  Preferred Language for Healthcare:   [x]English       []other:    Visit # Insurance Allowable   2 ??? Auth Required   []  Yes    [] No    Visits Approved  Date Ranged-       Pain level:  3-4/10     SUBJECTIVE:      OBJECTIVE: See eval   Observation:    Test measurements:      RESTRICTIONS/PRECAUTIONS: HTN, L TKR    Exercises/Interventions:       DOS:  19  Script:  19  Exercise Sets/Reps Notes Last Progression   Gastroc Stretch 5x30'' belt    Heelslides       LLLD Wallslide      HS Stretch:  Tableside  5x30''     SAQ      LAQ      SLR Flex 3x10 staggered    SLR Abd 3x10      SLR Ext      SLR Add.       Clamshells [address] 30x     Bridges on SB 3x10     HR/TR      Ankle Theraband      BAPS      Bike 5'     Elliptical      Standing Stretch:  (insert muscles)      Weight Shifting      Lateral Walk      Squats      Single Leg Stance Balance 3x30''     3 way balance 1' each airex    TKE on [facility] 60# 30x                                 Therapeutic Exercise and NMR EXR  [x] (94618) Provided verbal/tactile cueing for activities related to strengthening, flexibility, endurance, ROM for improvements in LE, proximal hip, and core control with self care, mobility, lifting, ambulation.  [] (85690) Provided verbal/tactile cueing for activities related to improving balance, coordination, kinesthetic sense, posture, motor skill, proprioception  to assist with LE, proximal hip, and core control in self care, mobility, lifting, ambulation and eccentric single leg control.      NMR and Therapeutic Activities:    [] (47548 or 21402) Provided verbal/tactile cueing for activities related to improving balance, coordination, kinesthetic sense, posture, motor skill, proprioception and motor activation to allow for proper function of core, proximal hip and LE with self care and ADLs  [] (86414) Gait Re-education- Provided training and instruction to the patient for proper LE, core and proximal hip recruitment and positioning and eccentric body weight control with ambulation re-education including up and down stairs     Home Exercise Program:    [x] (38483) Reviewed/Progressed HEP activities related to strengthening, flexibility, endurance, ROM of core, proximal hip and LE for functional self-care, mobility, lifting and ambulation/stair navigation   [] (99100)Reviewed/Progressed HEP activities related to improving balance, coordination, kinesthetic sense, posture, motor skill, proprioception of core, proximal hip and LE for self care, mobility, lifting, and ambulation/stair navigation      Manual Treatments:  PROM / Scar Mobs / STM/Rollerstick / Knee (Flex./Ext.)  Stretch: H.S. / ITB / Piriformis / Francisca Maker / Groin / Hip Flexor   [] (77086) Provided manual therapy to mobilize LE, proximal hip and/or LS spine soft tissue/joints for the purpose of modulating pain, promoting relaxation,  increasing ROM, reducing/eliminating soft tissue swelling/inflammation/restriction, improving soft tissue extensibility and allowing for proper ROM for normal function with self care, mobility, lifting and ambulation. Modalities:  CP/HV x 15'    Charges:  Timed Code Treatment Minutes: 45   Total Treatment Minutes: 60     [] EVAL (LOW) 94089 (typically 20 minutes face-to-face)  [] EVAL (MOD) 81487 (typically 30 minutes face-to-face)  [] EVAL (HIGH) 05286 (typically 45 minutes face-to-face)  [] RE-EVAL     [x] HN(87306) x  2   [] IONTO  [x] NMR (74109) x  1   [] VASO  [] Manual (92614) x      [] Other:  [] TA x      [] Mech Traction (59882)  [] ES(attended) (81298)      [x] ES (un) (24181):     GOALS:   Short Term Goals: To be achieved in: 2 weeks  1. Independent in HEP and progression per patient tolerance, in order to prevent re-injury. 2. Patient will have a decrease in pain to facilitate improvement in movement, function, and ADLs as indicated by Functional Deficits. Long Term Goals: To be achieved in: 5 weeks  1. Disability index score of 0% for the LEFS to assist with reaching prior level of function. 2. Patient will demonstrate increased AROM to 0-130 to allow for proper joint functioning as indicated by patients Functional Deficits. 3. Patient will demonstrate an increase in Strength to 5/5 knee ext/hip abd to allow for proper functional mobility as indicated by patients Functional Deficits. 4. Patient will return to walking with normal gait pattern and without increased symptoms or restriction. Progression Towards Functional goals:  [] Patient is progressing as expected towards functional goals listed. [] Progression is slowed due to complexities listed. [] Progression has been slowed due to co-morbidities.   [x] Plan just implemented, too soon to assess goals progression  [] Other:     ASSESSMENT:      Treatment/Activity Tolerance:  [x] Patient tolerated treatment well [] Patient limited by fatique  [] Patient limited by pain  [] Patient limited by other medical complications  [] Other:     Prognosis: [x] Good [] Fair  [] Poor    Patient Requires Follow-up: [x] Yes  [] No    PLAN FOR NEXT SESSION:     PLAN: See eval  [x] Continue per plan of care [] Alter current plan (see comments)  [] Plan of care initiated [] Hold pending MD visit [] Discharge    *If patient does not return for further follow ups after this date. Please consider this as the patients discharge from physical therapy.        Electronically signed by: Flora Oliva PT [id number]

## 2019-05-20 ENCOUNTER — OFFICE VISIT (OUTPATIENT)
Dept: ORTHOPEDIC SURGERY | Age: 64
End: 2019-05-20

## 2019-05-20 VITALS — BODY MASS INDEX: 34.36 KG/M2 | WEIGHT: 240 LBS | HEIGHT: 70 IN

## 2019-05-20 DIAGNOSIS — S83.231D COMPLEX TEAR OF MEDIAL MENISCUS OF RIGHT KNEE AS CURRENT INJURY, SUBSEQUENT ENCOUNTER: Primary | ICD-10-CM

## 2019-05-20 PROCEDURE — 99024 POSTOP FOLLOW-UP VISIT: CPT | Performed by: ORTHOPAEDIC SURGERY

## 2019-05-20 NOTE — PROGRESS NOTES
MD [place], [place]         Orthopaedic Surgery and Sports Medicine      Chief Complaint:   Post op right knee arthroscopy with partial medial menisectomy  Date of Surgery: 05/09/2019    History of Present Illness:  Neil Jauregui is a 75 y.o. male  Here for first follow up of right knee arthroscopy with partial medial menisectomy. Patient is progressing well and as expected. Has started outpatient physical therapy. The patient's pain is rated at 1/10. The patient denies fever, wound drainage, increasing redness, pus, increasing swelling. Post op problems reported: none. Taking oral pain medication as needed. Using local cold therapy as needed. Pain Assessment:         Right Knee Examination:  Patient is awake, alert, and in no acute distress. Dressing removed today. Portals are healing well. Skin is intact. Minimal ecchymosis. Sensation is intact to light touch throughout lower extremity. The wound is clean, dry, healing. There is no warmth, erythema, or purulent drainage over the incision. Patient tolerated gentle passive range of motion. Intra-operative findings were discussed. None  Diagnostic Test Findings: No new xrays taken today      Assessment: Progressing well post op from right knee arthroscopy with partial medial menisectomy. Plan:  Sutures were removed and steri-strips applied. Specific precautions were reviewed and emphasized. Patient will continue outpatient physical therapy. Follow up appointment was arranged at patient's convenience in 4 weeks. Non-steroidal anti-inflammatories medications (NSAIDs) can be used to assist with pain control and to reduce inflammatory changes. These medications may be over-the-counter or prescribed. We discussed taking the NSAID properly and the precautions. The patient understands that this medication may potentially interfere with other medications.   Patient was also instructed to immediately discontinue the medication is there is any possible complication. Willian Farrell PA-C, scribing for and in the presence of Dr. Jayjay Botello   5/20/2019 1:16 PM    I have evaluated the patient myself and completed the examination of the patient on date of visit. I have discussed the case and reviewed all pertinent data with the PAWillian, and agree with the plan noted above. Dr. Jayjay Botello MD      This dictation was performed with a verbal recognition program [facility]) and it was checked for errors. It is possible that there are still dictated errors within this office note. If so, please bring any errors to my attention for an addendum. All efforts were made to ensure that this office note is accurate.

## 2019-05-21 ENCOUNTER — HOSPITAL ENCOUNTER (OUTPATIENT)
Dept: PHYSICAL THERAPY | Age: 64
Setting detail: THERAPIES SERIES
Discharge: HOME | End: 2019-05-21
Payer: MEDICARE

## 2019-05-21 PROCEDURE — 97110 THERAPEUTIC EXERCISES: CPT | Performed by: PHYSICAL THERAPIST

## 2019-05-21 PROCEDURE — G0283 ELEC STIM OTHER THAN WOUND: HCPCS | Performed by: PHYSICAL THERAPIST

## 2019-05-21 PROCEDURE — 97112 NEUROMUSCULAR REEDUCATION: CPT | Performed by: PHYSICAL THERAPIST

## 2019-05-21 NOTE — FLOWSHEET NOTE
verbal/tactile cueing for activities related to strengthening, flexibility, endurance, ROM for improvements in LE, proximal hip, and core control with self care, mobility, lifting, ambulation. [x] (35207) Provided verbal/tactile cueing for activities related to improving balance, coordination, kinesthetic sense, posture, motor skill, proprioception  to assist with LE, proximal hip, and core control in self care, mobility, lifting, ambulation and eccentric single leg control.     See ATC worksheet for additional exercises performed under PT supervision      NMR and Therapeutic Activities:    [] (42926 or 47889) Provided verbal/tactile cueing for activities related to improving balance, coordination, kinesthetic sense, posture, motor skill, proprioception and motor activation to allow for proper function of core, proximal hip and LE with self care and ADLs  [] (97532) Gait Re-education- Provided training and instruction to the patient for proper LE, core and proximal hip recruitment and positioning and eccentric body weight control with ambulation re-education including up and down stairs     Home Exercise Program:    [x] (17566) Reviewed/Progressed HEP activities related to strengthening, flexibility, endurance, ROM of core, proximal hip and LE for functional self-care, mobility, lifting and ambulation/stair navigation   [] (51648)Reviewed/Progressed HEP activities related to improving balance, coordination, kinesthetic sense, posture, motor skill, proprioception of core, proximal hip and LE for self care, mobility, lifting, and ambulation/stair navigation      Manual Treatments:  PROM / Scar Mobs / STM/Rollerstick / Knee (Flex./Ext.)  Stretch: H.S. / ITB / Piriformis / Jennetta Jasen / Groin / Hip Flexor   [] (92054) Provided manual therapy to mobilize LE, proximal hip and/or LS spine soft tissue/joints for the purpose of modulating pain, promoting relaxation,  increasing ROM, reducing/eliminating soft tissue swelling/inflammation/restriction, improving soft tissue extensibility and allowing for proper ROM for normal function with self care, mobility, lifting and ambulation. Modalities:  CP/HV x 15'    Charges:  Timed Code Treatment Minutes: 45   Total Treatment Minutes: 60     [] EVAL (LOW) 67867 (typically 20 minutes face-to-face)  [] EVAL (MOD) 42694 (typically 30 minutes face-to-face)  [] EVAL (HIGH) 99134 (typically 45 minutes face-to-face)  [] RE-EVAL     [x] TF(58490) x  2   [] IONTO  [x] NMR (76728) x  1   [] VASO  [] Manual (07418) x      [] Other:  [] TA x      [] Mech Traction (10867)  [] ES(attended) (40503)      [x] ES (un) (64328):     GOALS:   Short Term Goals: To be achieved in: 2 weeks  1. Independent in HEP and progression per patient tolerance, in order to prevent re-injury. 2. Patient will have a decrease in pain to facilitate improvement in movement, function, and ADLs as indicated by Functional Deficits. Long Term Goals: To be achieved in: 5 weeks  1. Disability index score of 0% for the LEFS to assist with reaching prior level of function. 2. Patient will demonstrate increased AROM to 0-130 to allow for proper joint functioning as indicated by patients Functional Deficits. 3. Patient will demonstrate an increase in Strength to 5/5 knee ext/hip abd to allow for proper functional mobility as indicated by patients Functional Deficits. 4. Patient will return to walking with normal gait pattern and without increased symptoms or restriction. Progression Towards Functional goals:  [x] Patient is progressing as expected towards functional goals listed. [] Progression is slowed due to complexities listed. [] Progression has been slowed due to co-morbidities. [] Plan just implemented, too soon to assess goals progression  [] Other:     ASSESSMENT:  Steadily progressing with ROM, strength and function.      Treatment/Activity Tolerance:  [x] Patient tolerated treatment well [] Patient limited by fatique  [] Patient limited by pain  [] Patient limited by other medical complications  [] Other:     Prognosis: [x] Good [] Fair  [] Poor    Patient Requires Follow-up: [x] Yes  [] No    PLAN FOR NEXT SESSION:     PLAN: See eval  [x] Continue per plan of care [] Alter current plan (see comments)  [] Plan of care initiated [] Hold pending MD visit [] Discharge    *If patient does not return for further follow ups after this date. Please consider this as the patients discharge from physical therapy.        Electronically signed by: Juana Dixon PT [id number]

## 2019-05-21 NOTE — FLOWSHEET NOTE
The [address] [place] [address] [facility]    ATHLETIC TRAINING AREA ACTIVITY SHEET  Date:  2019    Patient Name:  Ngozi Perera    :  1955  MRN: [id number]  Restrictions/Precautions:    Medical/Treatment Diagnosis Information:  ·   Diagnosis: Right knee PMM    S83.231D  · Treatment Diagnosis: PT treatment diagnosis    ·    Physician Information:       Weeks Post-op  8 wks  12 wks 16 wks 20 wks   24 wks                            Activity Log                                                  DOS/DOI:                                                    Date: 19    Bike    Elliptical    Treadmill    Airdyne        Gastroc stretch    Soleus stretch    Hamstring stretch    ITB stretch    Hip Flexor stretch    Quad stretch    Adductor stretch        Weight Shifting sp                              fp                              tp    Lateral walking (with/w/o TB)        Balance: PEP/Vira board 2'                  SLS          Star excursion load/explode          Extremity reach UE/LE        Leg Press Lm. 100# 30X                     Ecc.                      Inv. Calf Press Lm. Ecc.                      Inv.        EAN   Flex               ABd 60# 20X EA              ADd               TKE 60# 30X              Ext        Steps  Up 6\" 30X               Up and Over                Down                Lateral                Rotation        Squats:  Mini                   Wall                   BOSU        Lunges:  Lunge to Balance                   Balance to Lunge                   Walking        Knee Extension Bilat. Ecc.                               Inv. Hamstring Curls Bilat. Ecc.                                Inv.        Soleus Press Bilat. Ecc.                            Inv.         Ladders    Square    Jump/Hop  Low                       Med. High            Modality            CP/ES 15'   ATC commun.     Initials DBW

## 2019-05-23 ENCOUNTER — HOSPITAL ENCOUNTER (OUTPATIENT)
Dept: PHYSICAL THERAPY | Age: 64
Setting detail: THERAPIES SERIES
Discharge: HOME | End: 2019-05-23
Payer: MEDICARE

## 2019-05-23 PROCEDURE — G0283 ELEC STIM OTHER THAN WOUND: HCPCS | Performed by: PHYSICAL THERAPIST

## 2019-05-23 PROCEDURE — 97112 NEUROMUSCULAR REEDUCATION: CPT | Performed by: PHYSICAL THERAPIST

## 2019-05-23 PROCEDURE — 97110 THERAPEUTIC EXERCISES: CPT | Performed by: PHYSICAL THERAPIST

## 2019-05-23 NOTE — FLOWSHEET NOTE
[facility] [address] [place] [address] [facility]    Physical Therapy Daily Treatment Note  Date:  2019    Patient Name:  Rita Babin    :  1955  MRN: [id number]  Restrictions/Precautions:    Medical/Treatment Diagnosis Information:  · Diagnosis: Right knee PMM    S83.231D  · Treatment Diagnosis: PT treatment diagnosis:  Right knee pain, stiffness, weakness  Insurance/Certification information:     Physician Information:  Referring Practitioner: Dr Jessica Lowe of care signed (Y/N):     Date of Patient follow up with Physician:     G-Code (if applicable):      Date G-Code Applied: The patient demonstrates at least 1% but less than 20% impairment, limitation or restriction in:   - walking and moving around (mobility)     Progress Note: [x]  Yes  []  No  Next due by: Visit #10       Latex Allergy:  [x]NO      []YES  Preferred Language for Healthcare:   [x]English       []other:    Visit # Insurance Allowable   4 ??? Auth Required   []  Yes    [] No    Visits Approved  Date Ranged-       Pain level:  1/10     SUBJECTIVE:   States the knee is doing well, no new c/o's since the last visit. OBJECTIVE: See eval   Observation:    Test measurements:      RESTRICTIONS/PRECAUTIONS: HTN, L TKR    Exercises/Interventions:       DOS:  19  Script:  19  Exercise Sets/Reps Notes Last Progression   Gastroc Stretch 5x30'' belt    Heelslides       LLLD Wallslide      HS Stretch:  Tableside  5x30''     SAQ      LAQ      SLR Flex 5x15'' Static-propped on elbows    SLR Abd 2x10 each circles     SLR Ext      SLR Add.       Clamshells green loop 30x     Bridges on SB 3x10     HR/TR      Ankle Theraband      BAPS      Bike 5'     Elliptical      Standing Stretch:  (insert muscles)      Weight Shifting      Lateral Walk      Squats      Single Leg Stance Balance 3x30'' airex    3 way balance 1' each 1/2 roll    TKE on [facility] 60# 30x                                 Therapeutic Exercise and NMR EXR  [x] (09994) Provided verbal/tactile cueing for activities related to strengthening, flexibility, endurance, ROM for improvements in LE, proximal hip, and core control with self care, mobility, lifting, ambulation. [x] (15149) Provided verbal/tactile cueing for activities related to improving balance, coordination, kinesthetic sense, posture, motor skill, proprioception  to assist with LE, proximal hip, and core control in self care, mobility, lifting, ambulation and eccentric single leg control.     See ATC worksheet for additional exercises performed under PT supervision      NMR and Therapeutic Activities:    [] (54488 or 77097) Provided verbal/tactile cueing for activities related to improving balance, coordination, kinesthetic sense, posture, motor skill, proprioception and motor activation to allow for proper function of core, proximal hip and LE with self care and ADLs  [] (95339) Gait Re-education- Provided training and instruction to the patient for proper LE, core and proximal hip recruitment and positioning and eccentric body weight control with ambulation re-education including up and down stairs     Home Exercise Program:    [x] (04576) Reviewed/Progressed HEP activities related to strengthening, flexibility, endurance, ROM of core, proximal hip and LE for functional self-care, mobility, lifting and ambulation/stair navigation   [] (40568)Reviewed/Progressed HEP activities related to improving balance, coordination, kinesthetic sense, posture, motor skill, proprioception of core, proximal hip and LE for self care, mobility, lifting, and ambulation/stair navigation      Manual Treatments:  PROM / Scar Mobs / STM/Rollerstick / Knee (Flex./Ext.)  Stretch: H.S. / ITB / Piriformis / Ana M Jetty / Groin / Hip Flexor   [] (63453) Provided manual therapy to mobilize LE, proximal hip and/or LS spine soft tissue/joints for the purpose of modulating pain, promoting relaxation,  increasing ROM, Treatment/Activity Tolerance:  [x] Patient tolerated treatment well [] Patient limited by fatique  [] Patient limited by pain  [] Patient limited by other medical complications  [] Other:     Prognosis: [x] Good [] Fair  [] Poor    Patient Requires Follow-up: [x] Yes  [] No    PLAN FOR NEXT SESSION:     PLAN: See eval  [x] Continue per plan of care [] Alter current plan (see comments)  [] Plan of care initiated [] Hold pending MD visit [] Discharge    *If patient does not return for further follow ups after this date. Please consider this as the patients discharge from physical therapy.        Electronically signed by: Juana Dixon PT [id number]

## 2019-05-23 NOTE — FLOWSHEET NOTE
[address] [facility]    ATHLETIC TRAINING AREA ACTIVITY SHEET  Date:  2019    Patient Name:  Atif Xiong    :  1955  MRN: [id number]  Restrictions/Precautions:    Medical/Treatment Diagnosis Information:  ·   Diagnosis: Right knee PMM    S83.231D  · Treatment Diagnosis: PT treatment diagnosis       Physician Information:       Weeks Post-op  8 wks  12 wks 16 wks 20 wks   24 wks                            Activity Log                                                  DOS/DOI:                                                    Date: 19   Bike     Elliptical     Treadmill     Airdyne          Gastroc stretch     Soleus stretch     Hamstring stretch     ITB stretch     Hip Flexor stretch     Quad stretch     Adductor stretch          Weight Shifting sp                               fp                               tp     Lateral walking (with/w/o TB)          Balance: PEP/Vira board 2' 2'                  SLS           Star excursion load/explode           Extremity reach UE/LE          Leg Press Lm. 100# 30X 100# 30X                     Ecc.                       Inv. Calf Press Lm. Ecc.                       Inv.          EAN   Flex                ABd 60# 20X EA 60# 30x ea. ADd                TKE 60# 30X 60# 30x ea. Ext          Steps  Up 6\" 30X 6\" 30x               Up and Over  6\" 20x               Down                 Lateral                 Rotation          Squats:  Mini                    Wall                    BOSU          Lunges:  Lunge to Balance                    Balance to Lunge                    Walking          Knee Extension Bilat. Ecc.                                Inv. Hamstring Curls Bilat. Ecc.                                 Inv.          Soleus Press Bilat.                             Ecc. Inv.          Ladders     Square     Jump/Hop  Low                        Med.                        High               Modality            CP/ES 15' 15'   ATC commun.      Initials DBW DBW

## (undated) DEVICE — T-DRAPE,EXTREMITY,STERILE: Brand: MEDLINE

## (undated) DEVICE — 3M™ TEGADERM™ TRANSPARENT FILM DRESSING FRAME STYLE, 1624W, 2-3/8 IN X 2-3/4 IN (6 CM X 7 CM), 100/CT 4CT/CASE: Brand: 3M™ TEGADERM™

## (undated) DEVICE — ZIMMER® STERILE DISPOSABLE TOURNIQUET CUFF WITH PLC, DUAL PORT, SINGLE BLADDER, 30 IN. (76 CM)

## (undated) DEVICE — MEDI-VAC NON-CONDUCTIVE SUCTION TUBING: Brand: CARDINAL HEALTH

## (undated) DEVICE — GLOVE,SURG,SENSICARE,ALOE,LF,PF,7: Brand: MEDLINE

## (undated) DEVICE — CONVERTED USE 304929 SPONGE GAUZE CURITY 4X4IN 16-PLY ST

## (undated) DEVICE — Z DISCONTINUED USE 2275686 GLOVE SURG SZ 8 L12IN FNGR THK13MIL WHT ISOLEX POLYISOPRENE

## (undated) DEVICE — SET ADMIN PRIMING 7ML L30IN 7.35LB 20 GTT 2ND RLER CLMP

## (undated) DEVICE — GLOVE SURG SZ 65 L12IN FNGR THK94MIL STD WHT LTX FREE

## (undated) DEVICE — AMBIENT SUPER TURBOVAC 90: Brand: COBLATION

## (undated) DEVICE — PADDING CAST W6INXL4YD ST COT RAYON MICROPLEATED HIGHLY

## (undated) DEVICE — Z CONVERTED USE 2273232 BANDAGE COMPR W6INXL11YD E KNIT DBL SELF CLSR EZE-BAND

## (undated) DEVICE — SOLUTION IV 1000ML LAC RINGERS PH 6.5 INJ USP VIAFLX PLAS

## (undated) DEVICE — GLOVE SURG SZ 75 L12IN FNGR THK94MIL STD WHT LTX FREE

## (undated) DEVICE — TUBE IRRIG L8IN LNG PT W/ CONN FOR PMP SYS REDEUCE

## (undated) DEVICE — SUTURE NONABSORBABLE MONOFILAMENT 3-0 PS-1 18 IN BLK ETHILON 1663H

## (undated) DEVICE — PACK PROCEDURE SURG ARTHSCP CUST

## (undated) DEVICE — GOWN,REINFORCED,POLY,AURORA,XXLARGE,STR: Brand: MEDLINE

## (undated) DEVICE — SET GRAV VENT NVENT CK VLV 3 NDL FREE PRT 10 GTT

## (undated) DEVICE — CATHETER IV 20GA L1.25IN PNK FEP SFTY STR HUB RADPQ DISP

## (undated) DEVICE — SOLUTION IRRIG 5L LAC R BG

## (undated) DEVICE — GOWN,SIRUS,POLYRNF,SETINSLV,L,20/CS: Brand: MEDLINE

## (undated) DEVICE — 3.5 MM FULL RADIUS STRAIGHT                                    BLADES, POWER/EP-1, BEIGE, PACKAGED                                    6 PER BOX, STERILE

## (undated) DEVICE — GLOVE SURG SZ 8 L12IN FNGR THK94MIL STD WHT LTX FREE

## (undated) DEVICE — 4-PORT MANIFOLD: Brand: NEPTUNE 2